# Patient Record
Sex: FEMALE | Race: AMERICAN INDIAN OR ALASKA NATIVE
[De-identification: names, ages, dates, MRNs, and addresses within clinical notes are randomized per-mention and may not be internally consistent; named-entity substitution may affect disease eponyms.]

---

## 2018-02-03 ENCOUNTER — HOSPITAL ENCOUNTER (EMERGENCY)
Dept: HOSPITAL 31 - C.ER | Age: 48
Discharge: HOME | End: 2018-02-03
Payer: COMMERCIAL

## 2018-02-03 VITALS
RESPIRATION RATE: 16 BRPM | TEMPERATURE: 98.1 F | DIASTOLIC BLOOD PRESSURE: 90 MMHG | SYSTOLIC BLOOD PRESSURE: 183 MMHG | HEART RATE: 72 BPM | OXYGEN SATURATION: 100 %

## 2018-02-03 DIAGNOSIS — J11.1: Primary | ICD-10-CM

## 2018-02-03 NOTE — RAD
HISTORY:

Cough r/o CHF  



COMPARISON:

No prior.



TECHNIQUE:

Chest PA and lateral



FINDINGS:



LUNGS:

No active pulmonary disease. . 



PLEURA:

No significant pleural effusion identified. No pneumothorax apparent.



CARDIOVASCULAR:

Normal.



OSSEOUS STRUCTURES:

Minor multilevel degenerative spondylosis of the thoracic spine. .



VISUALIZED UPPER ABDOMEN:

Normal.



OTHER FINDINGS:

None.



IMPRESSION:

No acute infiltrates.

## 2018-02-03 NOTE — C.PDOC
History Of Present Illness


47 year old female presents to the emergency room complaining of 1 week of 

generalized malaise. States that for the past 2 days shes developed flu-like 

symptoms with body aches and cough. No shortness of breath, chest pain, fevers 

or chills. Took Motrin yesterday without relief of symptoms. 





PMD: None 





Time Seen by Provider: 02/03/18 13:51


Chief Complaint (Nursing): Flu-like Symptoms


History Per: Patient


History/Exam Limitations: no limitations


Onset/Duration Of Symptoms: Days (x 1 week)


Current Symptoms Are (Timing): Still Present





Past Medical History


Reviewed: Historical Data, Nursing Documentation, Vital Signs


Vital Signs: 


 Last Vital Signs











Temp  98.1 F   02/03/18 13:43


 


Pulse  72   02/03/18 13:43


 


Resp  16   02/03/18 13:43


 


BP  183/90 H  02/03/18 13:43


 


Pulse Ox  100   02/03/18 15:46














- Medical History


PMH: HTN


Family History: States: No Known Family Hx





- Social History


Hx Tobacco Use: No


Hx Alcohol Use: No


Hx Substance Use: No





- Immunization History


Hx Tetanus Toxoid Vaccination: No


Hx Influenza Vaccination: No


Hx Pneumococcal Vaccination: No





Review Of Systems


Except As Marked, All Systems Reviewed And Found Negative.


Constitutional: Positive for: Malaise, Other (Body aches).  Negative for: Fever

, Chills


Cardiovascular: Negative for: Chest Pain


Respiratory: Positive for: Cough.  Negative for: Shortness of Breath





Physical Exam





- Physical Exam


Appears: Non-toxic, No Acute Distress


Skin: Normal Color, Warm, Dry


Head: Atraumatic, Normacephalic


Eye(s): bilateral: Normal Inspection, PERRL, EOMI


Oral Mucosa: Moist


Throat: Normal, No Erythema, No Exudate


Neck: Normal, Normal ROM


Chest: Symmetrical


Cardiovascular: Rhythm Regular


Respiratory: Normal Breath Sounds, No Accessory Muscle Use


Gastrointestinal/Abdominal: Normal Exam, Soft, No Tenderness


Neurological/Psych: Oriented x3, Normal Speech





ED Course And Treatment


O2 Sat by Pulse Oximetry: 100 (RA)


Pulse Ox Interpretation: Normal





- Radiology


CXR: Viewed By Me, Read By Radiologist


CXR Interpretation: Yes: No Acute Disease.  No: Infiltrates


Progress Note: Chest x-ray is negative for infiltrates. Stable for discharge 

home.  Treated with motrin and zithromax PO.  On re-evaluation lungs clear


Reassessment Condition: Improved





Medical Decision Making


Medical Decision Making: 





Time: 14:40


Initial Plan:


* Chest X-ray


* Motrin 600mg








Disposition


Counseled Patient/Family Regarding: Studies Performed, Diagnosis, Need For 

Followup, Rx Given





- Disposition


Referrals: 


Sergio Sheth DO [Staff Provider] - 


Disposition: HOME/ ROUTINE


Disposition Time: 16:00


Condition: STABLE


Additional Instructions: 


Follow up with PMD for further evaluation





Return to ED if any increase symptoms


Prescriptions: 


Azithromycin [Zithromax] 250 mg PO DAILY #4 tab


Oseltamivir Phosphate [Tamiflu] 75 mg PO BID #10 capsule


Instructions:  Influenza (ED)


Forms:  [facility] (English), Work Excuse





- POA


Present On Arrival: None





- Clinical Impression


Clinical Impression: 


 Influenza, Influenza-like illness








- PA / NP / Resident Statement


MD/ has reviewed & agrees with the documentation as recorded.





- Scribe Statement


The provider has reviewed the documentation as recorded by the Scribe (Regi English)





All medical record entries made by the Scribe were at my direction and 

personally dictated by me. I have reviewed the chart and agree that the record 

accurately reflects my personal performance of the history, physical exam, 

medical decision making, and the department course for this patient. I have 

also personally directed, reviewed, and agree with the discharge instructions 

and disposition.

## 2018-04-07 ENCOUNTER — HOSPITAL ENCOUNTER (INPATIENT)
Dept: HOSPITAL 42 - ED | Age: 48
LOS: 5 days | Discharge: HOME | DRG: 305 | End: 2018-04-12
Payer: COMMERCIAL

## 2018-04-07 VITALS — BODY MASS INDEX: 26.6 KG/M2

## 2018-04-07 DIAGNOSIS — F17.210: ICD-10-CM

## 2018-04-07 DIAGNOSIS — F12.90: ICD-10-CM

## 2018-04-07 DIAGNOSIS — I10: ICD-10-CM

## 2018-04-07 DIAGNOSIS — R40.2412: ICD-10-CM

## 2018-04-07 DIAGNOSIS — I16.0: Primary | ICD-10-CM

## 2018-04-07 DIAGNOSIS — E78.00: ICD-10-CM

## 2018-04-07 LAB
ALBUMIN SERPL-MCNC: 4.3 G/DL (ref 3–4.8)
ALBUMIN/GLOB SERPL: 1.2 {RATIO} (ref 1.1–1.8)
ALT SERPL-CCNC: 34 U/L (ref 7–56)
AST SERPL-CCNC: 43 U/L (ref 14–36)
BASOPHILS # BLD AUTO: 0.06 K/MM3 (ref 0–2)
BASOPHILS NFR BLD: 0.5 % (ref 0–3)
BUN SERPL-MCNC: 19 MG/DL (ref 7–21)
CALCIUM SERPL-MCNC: 9.5 MG/DL (ref 8.4–10.5)
EOSINOPHIL # BLD: 0.3 10*3/UL (ref 0–0.7)
EOSINOPHIL NFR BLD: 2.5 % (ref 1.5–5)
ERYTHROCYTE [DISTWIDTH] IN BLOOD BY AUTOMATED COUNT: 16.8 % (ref 11.5–14.5)
GFR NON-AFRICAN AMERICAN: > 60
GRANULOCYTES # BLD: 6.82 10*3/UL (ref 1.4–6.5)
GRANULOCYTES NFR BLD: 55.8 % (ref 50–68)
HGB BLD-MCNC: 11.8 G/DL (ref 12–16)
LYMPHOCYTES # BLD: 3.9 10*3/UL (ref 1.2–3.4)
LYMPHOCYTES NFR BLD AUTO: 32 % (ref 22–35)
MCH RBC QN AUTO: 28.8 PG (ref 25–35)
MCHC RBC AUTO-ENTMCNC: 33 G/DL (ref 31–37)
MCV RBC AUTO: 87.3 FL (ref 80–105)
MONOCYTES # BLD AUTO: 1.1 10*3/UL (ref 0.1–0.6)
MONOCYTES NFR BLD: 9.2 % (ref 1–6)
PLATELET # BLD: 382 10^3/UL (ref 120–450)
PMV BLD AUTO: 9.8 FL (ref 7–11)
RBC # BLD AUTO: 4.1 10^6/UL (ref 3.5–6.1)
TROPONIN I SERPL-MCNC: < 0.01 NG/ML
WBC # BLD AUTO: 12.2 10^3/UL (ref 4.5–11)

## 2018-04-07 NOTE — ED PDOC
Arrival/HPI





- General


Chief Complaint: High Blood Pressure


Time Seen by Provider: 04/07/18 16:23


Historian: Patient, EMS





- History of Present Illness


Time/Duration: Prior to Arrival


Symptom Course: Unchanged


Severity Level: Severe


Activities at Onset: Rest


Associated Symptoms (Text): 





04/07/18 16:34


Patient reports that she was at work and became hot and diaphoretic and 

developed visual disturbance. She states that this happens when she becomes 

hypertensive. She went to a drug store and took her blood pressure and it was 

elevated at 260/130. The pharmacist called 911 and she was brought to the 

emergency department via ambulance. No headache dizziness or lightheadedness. 

No chest pain palpitations or dyspnea. No nausea or vomiting. There is a 

history of hypertension.





Family/Social History





- Physician Review


Nursing Documentation Reviewed: Yes


Family/Social History: Unknown Family HX


Smoking Status: Light Smoker < 10 Cigarettes Daily


Hx Alcohol Use: Yes


Frequency of alcohol use: Few days per week


Hx Substance Use: No





Allergies/Home Meds


Allergies/Adverse Reactions: 


Allergies





No Known Allergies Allergy (Verified 04/07/18 16:29)


 








Home Medications: 


 Home Meds











 Medication  Instructions  Recorded  Confirmed


 


Atorvastatin [Lipitor] 10 mg PO DAILY 04/07/18 04/07/18


 


Valsartan [Diovan] 160 mg PO DAILY 04/07/18 04/07/18














Review of Systems





- Physician Review


All systems were reviewed & negative as marked: Yes





- Review of Systems


Constitutional: Normal.  absent: Fatigue, Fevers


Respiratory: Cough.  absent: SOB, Sputum, Wheezing


Cardiovascular: absent: Chest Pain, Palpitations, Syncope


Gastrointestinal: absent: Abdominal Pain, Nausea, Vomiting


Neurological: absent: Headache, Dizziness, Focal Weakness, Gait Changes





Physical Exam


Vital Signs











  Temp Pulse Resp BP Pulse Ox


 


 04/07/18 18:01   61  16  185/95 H  100


 


 04/07/18 16:50   65   237/128 H 


 


 04/07/18 16:32  97.6 F  64  18  237/128 H  100











Temperature: Afebrile


Blood Pressure: Hypertensive


Pulse: Regular


Respiratory Rate: Normal


Appearance: Positive for: Well-Appearing, Non-Toxic, Comfortable


Pain Distress: None


Mental Status: Positive for: Alert and Oriented X 3





- Systems Exam


Head: Present: Atraumatic, Normocephalic


Pupils: Present: PERRL


Extroacular Muscles: Present: EOMI


Conjunctiva: Present: Normal


Mouth: Present: Moist Mucous Membranes


Pharnyx: No: ERYTHEMA, EXUDATE, TONSILS ENLARGED


Neck: Present: Normal Range of Motion


Respiratory/Chest: Present: Clear to Auscultation, Good Air Exchange.  No: 

Respiratory Distress, Accessory Muscle Use


Cardiovascular: Present: Regular Rate and Rhythm, Normal S1, S2.  No: Murmurs


Abdomen: No: Tenderness, Distention, Peritoneal Signs, Rebound, Guarding


Upper Extremity: Present: Normal Inspection.  No: Cyanosis, Edema


Lower Extremity: Present: Normal Inspection.  No: Edema


Neurological: Present: GCS=15, CN II-XII Intact, Speech Normal, Motor Func 

Grossly Intact, Normal Cerebellar Funct, Gait Normal


Skin: Present: Warm, Dry, Normal Color.  No: Rashes


Psychiatric: Present: Alert, Oriented x 3, Normal Insight, Normal Concentration





Medical Decision Making


ED Course and Treatment: 





04/07/18 16:52


EKG shows normal sinus rhythm rate approximately 65 with no acute ST or T-wave 

changes


04/07/18 18:17


Blood pressure is improved, although the patient still states she does not feel 

right. She will be kept on telemetry observation bed.





- Lab Interpretations


Lab Results: 








 04/07/18 16:45 





 04/07/18 16:45 





 Lab Results





04/07/18 16:45: Sodium 137, Potassium 3.5 L, Chloride 106, Carbon Dioxide 21, 

Anion Gap 13, BUN 19, Creatinine 0.6 L, Est GFR ( Amer) > 60, Est GFR (

Non-Af Amer) > 60, Random Glucose 89, Calcium 9.5, Total Bilirubin 0.2, AST 43 H

, ALT 34, Alkaline Phosphatase 53, Lactate Dehydrogenase 627, Total Creatine 

Kinase 181, Troponin I < 0.01, Total Protein 7.8, Albumin 4.3, Globulin 3.5, 

Albumin/Globulin Ratio 1.2


04/07/18 16:45: WBC 12.2 H, RBC 4.10, Hgb 11.8 L, Hct 35.8 L, MCV 87.3, MCH 28.8

, MCHC 33.0, RDW 16.8 H, Plt Count 382, MPV 9.8, Gran % 55.8, Lymph % (Auto) 

32.0, Mono % (Auto) 9.2 H, Eos % (Auto) 2.5, Baso % (Auto) 0.5, Gran # 6.82 H, 

Lymph # (Auto) 3.9 H, Mono # (Auto) 1.1 H, Eos # (Auto) 0.3, Baso # (Auto) 0.06











- RAD Interpretation


Radiology Orders: 








04/07/18 16:33


CHEST PORTABLE [RAD] Stat 








Chest 1 view shows no infiltrate effusion or cardiomegaly


: ED Physician





- Medication Orders


Current Medication Orders: 











Discontinued Medications





Clonidine HCl (Catapres)  0.2 mg PO ONCE ONE


   Stop: 04/07/18 16:35


   Last Admin: 04/07/18 16:50  Dose: 0.2 mg





MAR Pulse and Blood Pressure


 Document     04/07/18 16:50  RR  (Rec: 04/07/18 16:50  RR  [id number])


     Pulse


      Pulse Rate (60-90 beats/min)               65


     Blood Pressure


      Blood Pressure (100//90 mm Hg)       237/128














Disposition/Present on Arrival





- Present on Arrival


Any Indicators Present on Arrival: No


History of DVT/PE: No


History of Uncontrolled Diabetes: No


Urinary Catheter: No


History of Decub. Ulcer: No





- Disposition


Have Diagnosis and Disposition been Completed?: Yes


Diagnosis: 


 Hypertension





Disposition: HOSPITALIZED


Disposition Time: 18:19


Patient Plan: Observation, Telemetry


Condition: IMPROVED


Forms:  [facility] (English)

## 2018-04-08 LAB
BUN SERPL-MCNC: 14 MG/DL (ref 7–21)
CALCIUM SERPL-MCNC: 9.1 MG/DL (ref 8.4–10.5)
ERYTHROCYTE [DISTWIDTH] IN BLOOD BY AUTOMATED COUNT: 16.6 % (ref 11.5–14.5)
GFR NON-AFRICAN AMERICAN: > 60
HGB BLD-MCNC: 11.2 G/DL (ref 12–16)
MCH RBC QN AUTO: 28.8 PG (ref 25–35)
MCHC RBC AUTO-ENTMCNC: 33.3 G/DL (ref 31–37)
MCV RBC AUTO: 86.4 FL (ref 80–105)
PLATELET # BLD: 353 10^3/UL (ref 120–450)
PMV BLD AUTO: 9.7 FL (ref 7–11)
RBC # BLD AUTO: 3.89 10^6/UL (ref 3.5–6.1)
TROPONIN I SERPL-MCNC: < 0.01 NG/ML
WBC # BLD AUTO: 7.3 10^3/UL (ref 4.5–11)

## 2018-04-08 NOTE — CP.PCM.HP
History of Present Illness





- History of Present Illness


History of Present Illness: 


CC: Uncontrolled HTN, and Diaphoretic





History of Present Illness:


A 48yo AAF was at work and became hot and diaphoretic and developed visual 

disturbance. She states that this happens when she becomes hypertensive. She 

went to a drug store and took her blood pressure and it was elevated at 260/

130. The pharmacist called 911 and she was brought to the emergency department 

via ambulance. 


No headache dizziness or lightheadedness. 


No chest pain palpitations or dyspnea. No nausea or vomiting. There is a 

history of hypertension


She was on Amlodipine and did not tolerate due to Palpitation and 

lightheadedness, and her cardiologist changed the BP meds to Diovan 160mg which 

did not control the BP. She was given Clonidine 0.2mg in the ER and BP fairly 

controlled Today. Admits using Marijuana but denies use of Cocaine or 

Amphetamine.





Present on Admission





- Present on Admission


Any Indicators Present on Admission: No





Review of Systems





- Review of Systems


All systems: reviewed and no additional remarkable complaints except





- Cardiovascular


Cardiovascular: As Per HPI





Past Patient History





- Infectious Disease


Hx of Infectious Diseases: None





- Past Medical History & Family History


Past Medical History?: Yes


Pertinent Family History: 





HTN





- Past Social History


Smoking Status: Light Smoker < 10 Cigarettes Daily


Alcohol: Social


Drugs: Cannabis





- CARDIAC


Hx Hypercholesterolemia:  (high lipids)


Hx Hypertension: Yes (dx age 17)


Other/Comment: pt started taking meds for htn about 5 yrs ago





- PULMONARY


Hx Respiratory Disorders: Yes (smoker 4 cigs a day)





- NEUROLOGICAL


Hx Neurological Disorder: No





- HEENT


Hx HEENT Problems: Yes


Other/Comment: blurred vision started today now subsiding





- RENAL


Hx Chronic Kidney Disease: No





- ENDOCRINE/METABOLIC


Hx Endocrine Disorders: No





- HEMATOLOGICAL/ONCOLOGICAL


Hx Blood Disorders: No





- INTEGUMENTARY


Hx Dermatological Problems: No





- MUSCULOSKELETAL/RHEUMATOLOGICAL


Hx Falls: No





- GENITOURINARY/GYNECOLOGICAL


Other/Comment: 2006 left breast cysts to axilla and nipple benign bx, c section 

1989





- PSYCHIATRIC


Hx Psychophysiologic Disorder: No





- SURGICAL HISTORY


Hx Surgeries: Yes


Other/Comment: left breast/axilla bx 2006, thyroid lesions bx 3/2018 neg no meds

, c section





- ANESTHESIA


Hx Anesthesia: No


Hx Anesthesia Reactions: No


Hx Malignant Hyperthermia: No





Meds


Allergies/Adverse Reactions: 


 Allergies











Allergy/AdvReac Type Severity Reaction Status Date / Time


 


No Known Allergies Allergy   Verified 04/07/18 16:29














Physical Exam





- Constitutional


Appears: Well, No Acute Distress





- Head Exam


Head Exam: ATRAUMATIC, NORMAL INSPECTION, NORMOCEPHALIC





- Eye Exam


Eye Exam: EOMI, Normal appearance, PERRL


Pupil Exam: NORMAL ACCOMODATION, PERRL





- ENT Exam


ENT Exam: Mucous Membranes Moist, Normal Exam





- Neck Exam


Neck exam: Positive for: Full Rom, Normal Inspection





- Respiratory Exam


Respiratory Exam: Clear to Auscultation Bilateral, NORMAL BREATHING PATTERN





- Cardiovascular Exam


Cardiovascular Exam: REGULAR RHYTHM, +S1, +S2





- GI/Abdominal Exam


GI & Abdominal Exam: Normal Bowel Sounds, Soft.  absent: Tenderness





- Rectal Exam


Rectal Exam: NORMAL INSPECTION





- Extremities Exam


Extremities exam: Positive for: full ROM, normal inspection





- Back Exam


Back exam: FULL ROM, NORMAL INSPECTION





- Neurological Exam


Neurological exam: Alert, CN II-XII Intact, Normal Gait, Oriented x3, Reflexes 

Normal





- Psychiatric Exam


Psychiatric exam: Normal Affect, Normal Mood





- Skin


Skin Exam: Dry, Intact, Normal Color, Warm





Results





- Vital Signs


Recent Vital Signs: 





 Last Vital Signs











Temp  97.7 F   04/08/18 05:55


 


Pulse  66   04/08/18 05:55


 


Resp  20   04/08/18 05:55


 


BP  163/83 H  04/08/18 05:55


 


Pulse Ox  97   04/08/18 05:55














- Labs


Result Diagrams: 


 04/08/18 07:31





 04/08/18 07:31


Labs: 





 Laboratory Results - last 24 hr











  04/08/18





  07:31


 


WBC  7.3  D


 


RBC  3.89


 


Hgb  11.2 L


 


Hct  33.6 L


 


MCV  86.4


 


MCH  28.8


 


MCHC  33.3


 


RDW  16.6 H


 


Plt Count  353


 


MPV  9.7














- EKG Data


EKG Interpreted by: Myself


EKG shows normal: Sinus rhythm, Axis, Intervals, QRS complexes


Rate: Normal





- Imaging and Cardiology


  ** Chest x-ray


Status: Report reviewed by me


Additional comment: 





No ACtive Disease





Assessment & Plan


(1) Hypertensive urgency


Assessment and Plan: 


Vs Emergency; Headache and Visual Changes


D/c Diovan


Clonidine 0.2mg BID


Neuro check Q4hrs


CT Head W/O Contrast


Monitor BP


Echocardiogram


Status: Acute   Priority: High   





(2) Smoker


Assessment and Plan: 


Counselled in Detail about Quilting smoking





Status: Chronic   Priority: Medium   





(3) Drug use


Status: Chronic   Priority: Medium

## 2018-04-08 NOTE — CARD
--------------- APPROVED REPORT --------------





EKG Measurement

Heart [id number]

MD [id number]

[id number]

[id number]

[id number]



<Conclusion>

Normal sinus rhythm

Normal ECG

## 2018-04-08 NOTE — CT
PROCEDURE:  CT HEAD WITHOUT CONTRAST.



HISTORY:

Hypertensive Urgency, Blurry Vision



COMPARISON:

None available. 



TECHNIQUE:

Axial computed tomography images were obtained through the head/brain 

without intravenous contrast.  



Radiation dose:



Total exam DLP = 876 mGy-cm.



This CT exam was performed using one or more of the following dose 

reduction techniques: Automated exposure control, adjustment of the 

mA and/or kV according to patient size, and/or use of iterative 

reconstruction technique.



FINDINGS:



HEMORRHAGE:

No intracranial hemorrhage. 



BRAIN:

No mass effect or edema.  No atrophy or chronic microvascular 

ischemic changes.



VENTRICLES:

Unremarkable. No hydrocephalus. 



CALVARIUM:

Unremarkable.



PARANASAL SINUSES:

Unremarkable as visualized. No significant inflammatory changes.



MASTOID AIR CELLS:

Unremarkable as visualized. No inflammatory changes.



OTHER FINDINGS:

None.



IMPRESSION:

Normal CT of the Head.

## 2018-04-09 NOTE — CP.PCM.PN
Subjective





- Date & Time of Evaluation


Date of Evaluation: 04/09/18


Time of Evaluation: 15:15





Objective





- Vital Signs/Intake and Output


Vital Signs (last 24 hours): 


 











Temp Pulse Resp BP Pulse Ox


 


 97.5 F L  56 L  20   137/72   100 


 


 04/09/18 17:14  04/09/18 17:14  04/09/18 17:14  04/09/18 18:41  04/09/18 06:00








Intake and Output: 


 











 04/09/18 04/10/18





 18:59 06:59


 


Intake Total 600 


 


Balance 600 














- Medications


Medications: 


 Current Medications





Acetaminophen (Tylenol 325mg Tab)  650 mg PO Q6H PRN


   PRN Reason: Pain, moderate (4-7)


   Last Admin: 04/09/18 22:18 Dose:  650 mg


Atorvastatin Calcium (Lipitor)  10 mg PO DIN CNIDY


   Last Admin: 04/09/18 17:02 Dose:  10 mg


Hydralazine HCl (Apresoline)  10 mg IVP Q6 PRN


   PRN Reason: hypertension


   Last Admin: 04/08/18 08:52 Dose:  10 mg


Hydralazine HCl (Apresoline)  50 mg PO TID [facility], 1928[facility]1983


   Last Admin: 04/09/18 18:41 Dose:  50 mg











Assessment and Plan


(1) Hypertensive urgency


Status: Acute   





(2) Smoker


Status: Chronic   





(3) Drug use


Status: Chronic

## 2018-04-10 LAB
ALBUMIN SERPL-MCNC: 4 G/DL (ref 3–4.8)
ALBUMIN/GLOB SERPL: 1.2 {RATIO} (ref 1.1–1.8)
ALT SERPL-CCNC: 25 U/L (ref 7–56)
AST SERPL-CCNC: 24 U/L (ref 14–36)
BASOPHILS # BLD AUTO: 0.01 K/MM3 (ref 0–2)
BASOPHILS NFR BLD: 0.1 % (ref 0–3)
BUN SERPL-MCNC: 13 MG/DL (ref 7–21)
CALCIUM SERPL-MCNC: 9.7 MG/DL (ref 8.4–10.5)
EOSINOPHIL # BLD: 0.2 10*3/UL (ref 0–0.7)
EOSINOPHIL NFR BLD: 1.7 % (ref 1.5–5)
ERYTHROCYTE [DISTWIDTH] IN BLOOD BY AUTOMATED COUNT: 16.5 % (ref 11.5–14.5)
GFR NON-AFRICAN AMERICAN: > 60
GRANULOCYTES # BLD: 5.63 10*3/UL (ref 1.4–6.5)
GRANULOCYTES NFR BLD: 60.9 % (ref 50–68)
HGB BLD-MCNC: 12.3 G/DL (ref 12–16)
LYMPHOCYTES # BLD: 2.6 10*3/UL (ref 1.2–3.4)
LYMPHOCYTES NFR BLD AUTO: 28 % (ref 22–35)
MCH RBC QN AUTO: 29.3 PG (ref 25–35)
MCHC RBC AUTO-ENTMCNC: 33.9 G/DL (ref 31–37)
MCV RBC AUTO: 86.4 FL (ref 80–105)
MONOCYTES # BLD AUTO: 0.9 10*3/UL (ref 0.1–0.6)
MONOCYTES NFR BLD: 9.3 % (ref 1–6)
PLATELET # BLD: 321 10^3/UL (ref 120–450)
PMV BLD AUTO: 9.3 FL (ref 7–11)
RBC # BLD AUTO: 4.2 10^6/UL (ref 3.5–6.1)
WBC # BLD AUTO: 9.3 10^3/UL (ref 4.5–11)

## 2018-04-10 NOTE — CP.PCM.PN
Subjective





- Date & Time of Evaluation


Date of Evaluation: 04/10/18


Time of Evaluation: 13:35





Objective





- Vital Signs/Intake and Output


Vital Signs (last 24 hours): 


 











Temp Pulse Resp BP Pulse Ox


 


 98.1 F   69   18   170/80 H  100 


 


 04/10/18 14:00  04/10/18 14:00  04/10/18 14:00  04/10/18 16:03  04/10/18 14:00











- Medications


Medications: 


 Current Medications





Acetaminophen (Tylenol 325mg Tab)  650 mg PO Q6H PRN


   PRN Reason: Pain, moderate (4-7)


   Last Admin: 04/09/18 22:18 Dose:  650 mg


Atorvastatin Calcium (Lipitor)  10 mg PO DIN CINDY


   Last Admin: 04/10/18 16:58 Dose:  10 mg


Hydralazine HCl (Apresoline)  10 mg IVP Q6 PRN


   PRN Reason: hypertension


   Last Admin: 04/10/18 06:41 Dose:  10 mg


Hydralazine HCl (Apresoline)  100 mg PO TID CINDY


   Last Admin: 04/10/18 16:59 Dose:  100 mg











- Labs


Labs: 


 





 04/10/18 14:00 





 04/10/18 14:00 











Assessment and Plan


(1) Hypertensive urgency


Status: Acute   





(2) Smoker


Status: Chronic   





(3) Drug use


Status: Chronic

## 2018-04-11 VITALS — RESPIRATION RATE: 20 BRPM

## 2018-04-11 NOTE — CP.PCM.PN
Subjective





- Date & Time of Evaluation


Date of Evaluation: 04/11/18


Time of Evaluation: 15:15





Objective





- Vital Signs/Intake and Output


Vital Signs (last 24 hours): 


 











Temp Pulse Resp BP Pulse Ox


 


 97.6 F   76   20   180/92 H  98 


 


 04/11/18 22:00  04/11/18 22:00  04/11/18 22:00  04/11/18 22:00  04/11/18 22:00








Intake and Output: 


 











 04/11/18 04/12/18





 18:59 06:59


 


Intake Total 1200 


 


Balance 1200 














- Medications


Medications: 


 Current Medications





Acetaminophen (Tylenol 325mg Tab)  650 mg PO Q6H PRN


   PRN Reason: Pain, moderate (4-7)


   Last Admin: 04/11/18 20:44 Dose:  650 mg


Atorvastatin Calcium (Lipitor)  10 mg PO DIN [facility]


   Last Admin: 04/11/18 18:09 Dose:  10 mg


Carvedilol (Coreg)  6.25 mg PO BID [facility]


   Last Admin: 04/11/18 18:09 Dose:  6.25 mg


Hydralazine HCl (Apresoline)  10 mg IVP Q6 PRN


   PRN Reason: hypertension


   Last Admin: 04/10/18 06:41 Dose:  10 mg


Hydralazine HCl (Apresoline)  100 mg PO TID [facility]


   Last Admin: 04/11/18 18:08 Dose:  100 mg











- Labs


Labs: 


 





 04/10/18 14:00 





 04/10/18 14:00 











Assessment and Plan


(1) Hypertensive urgency


Status: Acute   





(2) Smoker


Status: Chronic   





(3) Drug use


Status: Chronic

## 2018-04-12 VITALS — HEART RATE: 82 BPM | SYSTOLIC BLOOD PRESSURE: 157 MMHG | DIASTOLIC BLOOD PRESSURE: 95 MMHG

## 2018-04-12 VITALS — TEMPERATURE: 97.7 F | OXYGEN SATURATION: 99 %

## 2018-04-12 NOTE — CP.PCM.DIS
Provider





- Provider


Date of Admission: 


04/09/18 14:20





Attending physician: 


Hugh Bazan MD





Primary care physician: 


Bruce Burks DO





Time Spent in preparation of Discharge (in minutes): 30





Diagnosis





- Discharge Diagnosis


(1) Hypertensive urgency


Status: Acute   Priority: High   





(2) Smoker


Status: Chronic   Priority: Medium   





(3) Drug use


Status: Chronic   Priority: Medium   





Hospital Course





- Lab Results


Lab Results: 


 Most Recent Lab Values











WBC  9.3 10^3/ul (4.5-11.0)  D 04/10/18  14:00    


 


RBC  4.20 10^6/uL (3.5-6.1)   04/10/18  14:00    


 


Hgb  12.3 g/dL (12.0-16.0)   04/10/18  14:00    


 


Hct  36.3 % (36.0-48.0)   04/10/18  14:00    


 


MCV  86.4 fl (80.0-105.0)   04/10/18  14:00    


 


MCH  29.3 pg (25.0-35.0)   04/10/18  14:00    


 


MCHC  33.9 g/dl (31.0-37.0)   04/10/18  14:00    


 


RDW  16.5 % (11.5-14.5)  H  04/10/18  14:00    


 


Plt Count  321 10^3/uL (120.0-450.0)   04/10/18  14:00    


 


MPV  9.3 fl (7.0-11.0)   04/10/18  14:00    


 


Gran %  60.9 % (50.0-68.0)   04/10/18  14:00    


 


Lymph % (Auto)  28.0 % (22.0-35.0)   04/10/18  14:00    


 


Mono % (Auto)  9.3 % (1.0-6.0)  H  04/10/18  14:00    


 


Eos % (Auto)  1.7 % (1.5-5.0)   04/10/18  14:00    


 


Baso % (Auto)  0.1 % (0.0-3.0)   04/10/18  14:00    


 


Gran #  5.63  (1.4-6.5)   04/10/18  14:00    


 


Lymph # (Auto)  2.6  (1.2-3.4)   04/10/18  14:00    


 


Mono # (Auto)  0.9  (0.1-0.6)  H  04/10/18  14:00    


 


Eos # (Auto)  0.2  (0.0-0.7)   04/10/18  14:00    


 


Baso # (Auto)  0.01 K/mm3 (0.0-2.0)   04/10/18  14:00    


 


Sodium  138 mmol/L (132-148)   04/10/18  14:00    


 


Potassium  4.5 mmol/L (3.6-5.0)   04/10/18  14:00    


 


Chloride  105 mmol/L ()   04/10/18  14:00    


 


Carbon Dioxide  26 mmol/L (21-33)   04/10/18  14:00    


 


Anion Gap  13  (10-20)   04/10/18  14:00    


 


BUN  13 mg/dL (7-21)   04/10/18  14:00    


 


Creatinine  0.7 mg/dl (0.7-1.2)   04/10/18  14:00    


 


Est GFR ( Amer)  > 60   04/10/18  14:00    


 


Est GFR (Non-Af Amer)  > 60   04/10/18  14:00    


 


Random Glucose  95 mg/dL ()   04/10/18  14:00    


 


Calcium  9.7 mg/dL (8.4-10.5)   04/10/18  14:00    


 


Total Bilirubin  0.4 mg/dL (0.2-1.3)   04/10/18  14:00    


 


AST  24 U/L (14-36)   04/10/18  14:00    


 


ALT  25 U/L (7-56)   04/10/18  14:00    


 


Alkaline Phosphatase  45 U/L ()   04/10/18  14:00    


 


Lactate Dehydrogenase  627 U/L (333-699)   04/07/18  16:45    


 


Total Creatine Kinase  181 U/L ()   04/07/18  16:45    


 


Troponin I  < 0.01 ng/mL  04/08/18  07:31    


 


Total Protein  7.5 g/dL (5.8-8.3)   04/10/18  14:00    


 


Albumin  4.0 g/dL (3.0-4.8)   04/10/18  14:00    


 


Globulin  3.5 gm/dL  04/10/18  14:00    


 


Albumin/Globulin Ratio  1.2  (1.1-1.8)   04/10/18  14:00    


 


Free T4  0.96 ng/dL (0.78-2.19)   04/10/18  14:00    


 


Free T3 pg/mL  3.69 pg/mL (2.77-5.27)   04/10/18  14:00    


 


TSH 3rd Generation  0.37 mIU/mL (0.46-4.68)  L  04/09/18  16:00    


 


Urine Opiates Screen  Negative  (NEGATIVE)   04/12/18  00:30    


 


Urine Methadone Screen  Negative  (NEGATIVE)   04/12/18  00:30    


 


Ur Barbiturates Screen  Negative  (NEGATIVE)   04/12/18  00:30    


 


Ur Phencyclidine Scrn  Negative  (NEGATIVE)   04/12/18  00:30    


 


Ur Amphetamines Screen  Negative  (NEGATIVE)   04/12/18  00:30    


 


U Benzodiazepines Scrn  Negative  (NEGATIVE)   04/12/18  00:30    


 


U Oth Cocaine Metabols  Negative  (NEGATIVE)   04/12/18  00:30    


 


U Cannabinoids Screen  Positive  (NEGATIVE)  H  04/12/18  00:30    














Discharge Exam





- Head Exam


Head Exam: ATRAUMATIC, NORMAL INSPECTION, NORMOCEPHALIC





Discharge Plan





- Discharge Medications


Prescriptions: 


hydrALAZINE [Apresoline] 100 mg PO TID #90 tab


Carvedilol [Coreg] 6.25 mg PO BID #60 tablet





- Follow Up Plan


Condition: IMPROVED


Disposition: HOME/ ROUTINE


Instructions:  High Blood Pressure (DC), Controlling Your Blood Pressure 

Through Lifestyle, High Blood Pressure Emergencies, Medicines for High Blood 

Pressure


Additional Instructions: 


Monitor BP





monitor increased symptoms head ache, blurred vision, increased sweating





Continue taking medications as directed from MD





come back to ER if your symptoms worsen.





Follow up with cardiologist. 





Apply ice packs for headaches if they help.
